# Patient Record
Sex: FEMALE | Race: WHITE | NOT HISPANIC OR LATINO | URBAN - METROPOLITAN AREA
[De-identification: names, ages, dates, MRNs, and addresses within clinical notes are randomized per-mention and may not be internally consistent; named-entity substitution may affect disease eponyms.]

---

## 2017-08-23 ENCOUNTER — OFFICE (OUTPATIENT)
Dept: URBAN - METROPOLITAN AREA CLINIC 101 | Facility: CLINIC | Age: 82
End: 2017-08-23
Payer: OTHER GOVERNMENT

## 2017-08-23 VITALS — WEIGHT: 161 LBS | HEIGHT: 63 IN

## 2017-08-23 DIAGNOSIS — K62.5 HEMORRHAGE OF ANUS AND RECTUM: ICD-10-CM

## 2017-08-23 PROCEDURE — 99203 OFFICE O/P NEW LOW 30 MIN: CPT

## 2017-08-23 NOTE — SERVICEHPINOTES
Mrs. Melchor is here to discuss rectal bleeding which is described as intermittent. She has experienced this in the past and colonoscopies have revealed internal and external colonoscopies. No hx of polyps upon four colonoscopies. She woke up in the middle of the night and left a trail of blood drops from the bed to the bathroom. She has dementia and her  tells her history. He notes that she will have red blood on the back of pants from time to time. No known rectal pain. No diarrhea and occasional constipation. No substantional weight loss. No regular NSAID use. Of late, her  mentions mild dizziness in the morning upon waking but no current dizziness, SOB, etc.

## 2017-09-06 ENCOUNTER — OFFICE (OUTPATIENT)
Dept: URBAN - METROPOLITAN AREA CLINIC 33 | Facility: CLINIC | Age: 82
End: 2017-09-06
Payer: OTHER GOVERNMENT

## 2017-09-06 VITALS
WEIGHT: 160 LBS | DIASTOLIC BLOOD PRESSURE: 80 MMHG | HEIGHT: 63 IN | TEMPERATURE: 97.5 F | HEART RATE: 74 BPM | SYSTOLIC BLOOD PRESSURE: 133 MMHG

## 2017-09-06 DIAGNOSIS — K62.5 HEMORRHAGE OF ANUS AND RECTUM: ICD-10-CM

## 2017-09-06 DIAGNOSIS — K64.0 FIRST DEGREE HEMORRHOIDS: ICD-10-CM

## 2017-09-06 PROBLEM — K64.8 OTHER HEMORRHOIDS: Status: ACTIVE | Noted: 2017-09-06

## 2017-09-06 PROCEDURE — 46221 LIGATION OF HEMORRHOID(S): CPT

## 2017-10-03 ENCOUNTER — OFFICE (OUTPATIENT)
Dept: URBAN - METROPOLITAN AREA CLINIC 33 | Facility: CLINIC | Age: 82
End: 2017-10-03
Payer: OTHER GOVERNMENT

## 2017-10-03 VITALS
HEART RATE: 79 BPM | HEIGHT: 63 IN | SYSTOLIC BLOOD PRESSURE: 121 MMHG | DIASTOLIC BLOOD PRESSURE: 87 MMHG | WEIGHT: 156 LBS | TEMPERATURE: 97.5 F

## 2017-10-03 DIAGNOSIS — K64.8 OTHER HEMORRHOIDS: ICD-10-CM

## 2017-10-03 DIAGNOSIS — K64.1 SECOND DEGREE HEMORRHOIDS: ICD-10-CM

## 2017-10-03 DIAGNOSIS — K62.5 HEMORRHAGE OF ANUS AND RECTUM: ICD-10-CM

## 2017-10-03 PROCEDURE — 46221 LIGATION OF HEMORRHOID(S): CPT

## 2017-11-03 ENCOUNTER — OFFICE (OUTPATIENT)
Dept: URBAN - METROPOLITAN AREA CLINIC 78 | Facility: CLINIC | Age: 82
End: 2017-11-03
Payer: OTHER GOVERNMENT

## 2017-11-03 VITALS
WEIGHT: 160 LBS | DIASTOLIC BLOOD PRESSURE: 73 MMHG | SYSTOLIC BLOOD PRESSURE: 110 MMHG | HEIGHT: 63 IN | TEMPERATURE: 98.3 F | HEART RATE: 78 BPM

## 2017-11-03 DIAGNOSIS — K64.2 THIRD DEGREE HEMORRHOIDS: ICD-10-CM

## 2017-11-03 DIAGNOSIS — K62.5 HEMORRHAGE OF ANUS AND RECTUM: ICD-10-CM

## 2017-11-03 PROBLEM — K64.1 BANDING LIGATION FOR HEMORRHOIDS, INTERNAL, COMPLICATED: Status: ACTIVE | Noted: 2017-09-06

## 2017-11-03 PROCEDURE — 46221 LIGATION OF HEMORRHOID(S): CPT

## 2018-05-18 ENCOUNTER — OFFICE (OUTPATIENT)
Dept: URBAN - METROPOLITAN AREA CLINIC 101 | Facility: CLINIC | Age: 83
End: 2018-05-18
Payer: OTHER GOVERNMENT

## 2018-05-18 VITALS
WEIGHT: 130 LBS | DIASTOLIC BLOOD PRESSURE: 61 MMHG | TEMPERATURE: 97.9 F | HEIGHT: 63 IN | SYSTOLIC BLOOD PRESSURE: 110 MMHG | HEART RATE: 77 BPM

## 2018-05-18 DIAGNOSIS — K62.5 HEMORRHAGE OF ANUS AND RECTUM: ICD-10-CM

## 2018-05-18 DIAGNOSIS — F03.90 UNSPECIFIED DEMENTIA, UNSPECIFIED SEVERITY, WITHOUT BEHAVIOR: ICD-10-CM

## 2018-05-18 DIAGNOSIS — Z12.11 ENCOUNTER FOR SCREENING FOR MALIGNANT NEOPLASM OF COLON: ICD-10-CM

## 2018-05-18 PROCEDURE — 99214 OFFICE O/P EST MOD 30 MIN: CPT

## 2018-05-18 NOTE — SERVICEHPINOTES
ISIDORO HAMMER   is a   84  female who presents for OV prior to colonoscopy. PMH includes dementia (Alzheimer's) and she comes from a nursing home accompanied by an aide. She has had multiple colonoscopies in the past--in 2000, 2003, 2007, and 2010--no polyps found on any prior colonoscopies. BRShe just recently had hemorrhoid banding through our office at the end of last year. Pt and aide deny seeing any further blood per rectum. She denies diarrhea, constipation, blood in the stool, or abdominal pain. According to her chart, her father had colon cancer but pt does not recall if this is true or not (or any other details).